# Patient Record
Sex: MALE | Race: BLACK OR AFRICAN AMERICAN
[De-identification: names, ages, dates, MRNs, and addresses within clinical notes are randomized per-mention and may not be internally consistent; named-entity substitution may affect disease eponyms.]

---

## 2018-09-20 ENCOUNTER — HOSPITAL ENCOUNTER (OUTPATIENT)
Dept: HOSPITAL 75 - RAD | Age: 23
End: 2018-09-20
Attending: ORTHOPAEDIC SURGERY
Payer: COMMERCIAL

## 2018-09-20 DIAGNOSIS — S76.111A: ICD-10-CM

## 2018-09-20 DIAGNOSIS — S83.511A: Primary | ICD-10-CM

## 2018-09-20 DIAGNOSIS — S70.11XA: ICD-10-CM

## 2018-09-20 DIAGNOSIS — M76.51: ICD-10-CM

## 2018-09-20 DIAGNOSIS — S80.12XA: ICD-10-CM

## 2018-09-20 PROCEDURE — 73721 MRI JNT OF LWR EXTRE W/O DYE: CPT

## 2018-09-20 NOTE — DIAGNOSTIC IMAGING REPORT
PROCEDURE: MRI right joint lower extremity without contrast.



TECHNIQUE: Multiplanar, multisequence MR imaging of the right

knee was performed without contrast.



COMPARISON: None available. 



INDICATION: Knee pain after injury.



FINDINGS:



MENISCI 

Medial meniscus: Normal.

Lateral meniscus: Normal.



LIGAMENTS

ACL: Complete tear of the ACL in its mid aspect.

PCL: Intact.

MCL: Intact.

LCL: The lateral collateral ligamentous complex is intact.



EXTENSOR MECHANISM

Patellar tendon has partial-thickness tearing at its origin from

the patella.



CARTILAGE

Medial compartment: Medial compartment articular cartilage is

well preserved without focal high-grade chondromalacia.

Lateral compartment: The lateral compartment articular cartilage

is preserved without high-grade chondromalacia.

Patellofemoral compartment: Full-thickness chondral fissure in

the lateral patellar facet.



BONE

There are osseous kissing contusions involving the subchondral

aspect of the central lateral femoral condyle and posterior

lateral tibial plateau. A small amount of bone contusion is also

present in the posterior aspect of the medial tibial plateau.



SOFT TISSUE

Moderate-sized knee joint effusion. No Baker's cyst. No features

of posterolateral corner injury.



IMPRESSION: 

1. Complete ACL tear.

2. Associated bone contusions in the lateral femoral condyle and

posterior aspect of the medial and lateral tibial plateaus.

3. No meniscal tear.

4. Focal full-thickness chondral fissuring in the lateral

patellar facet.

5. Partial-thickness tear and tendinitis at the origin of the

patellar tendon is likely subacute to chronic in nature and

associated with jumper's knee.

6. Moderate-sized knee joint effusion.



Dictated by: 



  Dictated on workstation # [id number]